# Patient Record
Sex: MALE | Race: WHITE | URBAN - METROPOLITAN AREA
[De-identification: names, ages, dates, MRNs, and addresses within clinical notes are randomized per-mention and may not be internally consistent; named-entity substitution may affect disease eponyms.]

---

## 2017-10-15 ENCOUNTER — HOSPITAL ENCOUNTER (EMERGENCY)
Facility: CLINIC | Age: 22
Discharge: HOME | End: 2017-10-15
Attending: EMERGENCY MEDICINE | Admitting: EMERGENCY MEDICINE
Payer: COMMERCIAL

## 2017-10-15 VITALS
DIASTOLIC BLOOD PRESSURE: 85 MMHG | RESPIRATION RATE: 16 BRPM | HEART RATE: 88 BPM | TEMPERATURE: 97.8 F | SYSTOLIC BLOOD PRESSURE: 132 MMHG | OXYGEN SATURATION: 99 %

## 2017-10-15 DIAGNOSIS — F10.920 ALCOHOLIC INTOXICATION WITHOUT COMPLICATION (H): ICD-10-CM

## 2017-10-15 DIAGNOSIS — F10.120 HANGOVER WITHOUT COMPLICATION (H): ICD-10-CM

## 2017-10-15 LAB
ALCOHOL BREATH TEST: 0.2 (ref 0–0.01)
ALCOHOL BREATH TEST: 0.25 (ref 0–0.01)

## 2017-10-15 PROCEDURE — 99284 EMERGENCY DEPT VISIT MOD MDM: CPT | Mod: Z6 | Performed by: EMERGENCY MEDICINE

## 2017-10-15 PROCEDURE — 99283 EMERGENCY DEPT VISIT LOW MDM: CPT | Performed by: EMERGENCY MEDICINE

## 2017-10-15 PROCEDURE — 82075 ASSAY OF BREATH ETHANOL: CPT | Performed by: EMERGENCY MEDICINE

## 2017-10-15 ASSESSMENT — ENCOUNTER SYMPTOMS
SORE THROAT: 0
CARDIOVASCULAR NEGATIVE: 1
CHILLS: 0
ABDOMINAL PAIN: 0
NEUROLOGICAL NEGATIVE: 1
RESPIRATORY NEGATIVE: 1
VOMITING: 1

## 2017-10-15 NOTE — ED NOTES
Bed: ED10  Expected date: 10/15/17  Expected time: 1:48 AM  Means of arrival: Ambulance  Comments:  Jamil 422  22 M  ETOH

## 2017-10-15 NOTE — ED AVS SNAPSHOT
[facility], Emergency Department    [address]    [place] [address]    Phone:  [phone]    Fax:  [phone]                                       Low Mojica   MRN: [id number]    Department:  [facility], Emergency Department   Date of Visit:  10/15/2017           Patient Information     Date Of Birth          1995        Your diagnoses for this visit were:     Alcoholic intoxication without complication (H)        You were seen by Misty Cross MD.        Discharge Instructions       Thank you for coming to the [facility] Emergency Department.     Cut back on alcohol intake. Your breath alcohol level tonight was 0.244.     OK to resume your usual activities and fly home today as scheduled.     24 Hour Appointment Hotline       To make an appointment at any [facility] clinic, call [phone] ([phone]). If you don't have a family doctor or clinic, we will help you find one. [facility] clinics are conveniently located to serve the needs of you and your family.             Review of your medicines      Notice     You have not been prescribed any medications.            Procedures and tests performed during your visit     Procedure/Test Number of Times Performed    Alcohol breath test POCT 2      Orders Needing Specimen Collection     None      Pending Results     No orders found from 10/13/2017 to 10/16/2017.            Pending Culture Results     No orders found from 10/13/2017 to 10/16/2017.            Pending Results Instructions     If you had any lab results that were not finalized at the time of your Discharge, you can call the ED Lab Result RN at [phone]. You will be contacted by this team for any positive Lab results or changes in treatment. The nurses are available 7 days a week from 10A to 6:30P.  You can leave a message 24 hours per day and they will return your call.        Thank you for choosing [facility]       Thank you for  "choosing [facility] for your care. Our goal is always to provide you with excellent care. Hearing back from our patients is one way we can continue to improve our services. Please take a few minutes to complete the written survey that you may receive in the mail after you visit with us. Thank you!        [employer] Information     [email or web address] lets you send messages to your doctor, view your test results, renew your prescriptions, schedule appointments and more. To sign up, go to [email or web address] . Click on \"Log in\" on the left side of the screen, which will take you to the Welcome page. Then click on \"Sign up Now\" on the right side of the page.     You will be asked to enter the access code listed below, as well as some personal information. Please follow the directions to create your username and password.     Your access code is: [email or web address]  Expires: 2018  8:53 AM     Your access code will  in 90 days. If you need help or a new code, please call your [facility] clinic or [phone].        Care EveryWhere ID     This is your Care EveryWhere ID. This could be used by other organizations to access your [facility] medical records  [id number]        Equal Access to Services     TOY HUSSEIN : Susan Fletcher, waaxda mortezaadaha, qaybta kaalmada adeegyada, j luis wakefield. So [facility] [phone].    ATENCIÓN: Si habla español, tiene a saha disposición servicios gratuitos de asistencia lingüística. Llame al [phone].    We comply with applicable federal civil rights laws and [place] laws. We do not discriminate on the basis of race, color, national origin, age, disability, sex, sexual orientation, or gender identity.            After Visit Summary       This is your record. Keep this with you and show to your community pharmacist(s) and doctor(s) at your next visit.                  "

## 2017-10-15 NOTE — DISCHARGE INSTRUCTIONS
Thank you for coming to the [facility] Emergency Department.     Cut back on alcohol intake. Your breath alcohol level tonight was 0.244.     OK to resume your usual activities and fly home today as scheduled.

## 2017-10-15 NOTE — ED NOTES
. Intoxicated and unable to care for self. Friends called ambulance due to vomiting and incontinence and feared something was in his drink.

## 2017-10-15 NOTE — ED AVS SNAPSHOT
[facility], [place], Emergency Department    [address] [place] [place] [address]    Phone:  [phone]    Fax:  [phone]                                       Low Mojica   MRN: [id number]    Department:  [facility], Emergency Department   Date of Visit:  10/15/2017           After Visit Summary Signature Page     I have received my discharge instructions, and my questions have been answered. I have discussed any challenges I see with this plan with the nurse or doctor.    ..........................................................................................................................................  Patient/Patient Representative Signature      ..........................................................................................................................................  Patient Representative Print Name and Relationship to Patient    ..................................................               ................................................  Date                                            Time    ..........................................................................................................................................  Reviewed by Signature/Title    ...................................................              ..............................................  Date                                                            Time

## 2017-10-15 NOTE — ED PROVIDER NOTES
History     Chief Complaint   Patient presents with     Alcohol Intoxication     . Intoxicated and unable to care for self. Friends called ambulance due to vomiting and incontinence and feared something was in his drink.     HPI  Low Mojica is a 22 year old male who presents with alcohol intoxication.  He is visiting [place] from [place] and went to the [employer] game tonight.  He drank heavily again afterwards.  His friends called EMS when he was intoxicated, incontinent, vomiting, and unable to care for himself.  He denies use of any drugs tonight.    No falls or other trauma.  He has no medical concerns.    I have reviewed the Medications, Allergies, Past Medical and Surgical History, and Social History in the Epic system.    PMH: allergy symptoms    PSH: adenoids removed.     Social History   Substance Use Topics     Smoking status: Never Smoker     Smokeless tobacco: Never Used     Alcohol use Yes       Review of Systems   Constitutional: Negative for chills.   HENT: Negative for sore throat.         Large tonsils   Respiratory: Negative.    Cardiovascular: Negative.    Gastrointestinal: Positive for vomiting. Negative for abdominal pain.   Genitourinary: Negative.    Neurological: Negative.    All other systems reviewed and are negative.         Physical Exam   BP: (!) 133/100  Pulse: 91  Temp: 95.9  F (35.5  C)  Resp: 18  SpO2: 100 %       Physical Exam   Gen:Alert, cooperative but intoxicated  HEENT:PERRL, no facial tenderness or wounds, head atraumatic, oropharynx clear, mucous membranes moist, TMs clear bilaterally  Neck/Back: nontender  CV:RRR without murmurs  PULM:Clear to auscultation bilaterally  Abd:soft, nontender, nondistended. Bowel sounds present and normal  UE:No traumatic injuries, skin normal  LE:no traumatic injuries, skin normal  Neuro:CN II-XII intact, strength 5/5 throughout, gait stable  Skin: no rashes or ecchymoses      ED Course     ED Course     Procedures              Critical Care time:  none             Labs Ordered and Resulted from Time of ED Arrival Up to the Time of Departure from the ED   ALCOHOL BREATH TEST POCT - Abnormal; Notable for the following:        Result Value    Alcohol Breath Test 0.249 (*)     All other components within normal limits            Assessments & Plan (with Medical Decision Making)   22-year-old male presenting with alcohol intoxication.  Admitting by EMS.  Blood sugar 103.  Alcohol breath test 0.249.  Vitals stable.  No signs of traumatic injury or report of falls.  No medical complaints.  He vomited prior to arrival but denies current nausea.  No return of vomiting.    He was monitored in the emergency department and sobered as expected.  Discharged.  He is scheduled to fly out of [place] airport at noon today and he is safe for flight.    I have reviewed the nursing notes.    I have reviewed the findings, diagnosis, plan and need for follow up with the patient.    New Prescriptions    No medications on file       Final diagnoses:   Alcoholic intoxication without complication (H)       10/15/2017   [facility], [place], EMERGENCY DEPARTMENT    MD CAMACHO Copeland Katrina Anne, MD  10/15/17 0316